# Patient Record
Sex: MALE | Race: BLACK OR AFRICAN AMERICAN | NOT HISPANIC OR LATINO | Employment: UNEMPLOYED | URBAN - METROPOLITAN AREA
[De-identification: names, ages, dates, MRNs, and addresses within clinical notes are randomized per-mention and may not be internally consistent; named-entity substitution may affect disease eponyms.]

---

## 2024-07-17 ENCOUNTER — PHARMACY VISIT (OUTPATIENT)
Dept: PHARMACY | Facility: MEDICAL CENTER | Age: 44
End: 2024-07-17
Payer: COMMERCIAL

## 2024-07-17 ENCOUNTER — HOSPITAL ENCOUNTER (EMERGENCY)
Facility: MEDICAL CENTER | Age: 44
End: 2024-07-17
Attending: EMERGENCY MEDICINE
Payer: COMMERCIAL

## 2024-07-17 VITALS
TEMPERATURE: 97.7 F | SYSTOLIC BLOOD PRESSURE: 145 MMHG | RESPIRATION RATE: 16 BRPM | BODY MASS INDEX: 38.3 KG/M2 | DIASTOLIC BLOOD PRESSURE: 88 MMHG | OXYGEN SATURATION: 100 % | WEIGHT: 244.05 LBS | HEART RATE: 53 BPM | HEIGHT: 67 IN

## 2024-07-17 DIAGNOSIS — R56.9 SEIZURE (HCC): ICD-10-CM

## 2024-07-17 PROCEDURE — 700102 HCHG RX REV CODE 250 W/ 637 OVERRIDE(OP): Mod: UD | Performed by: EMERGENCY MEDICINE

## 2024-07-17 PROCEDURE — RXMED WILLOW AMBULATORY MEDICATION CHARGE: Performed by: EMERGENCY MEDICINE

## 2024-07-17 PROCEDURE — A9270 NON-COVERED ITEM OR SERVICE: HCPCS | Mod: UD | Performed by: EMERGENCY MEDICINE

## 2024-07-17 PROCEDURE — 99283 EMERGENCY DEPT VISIT LOW MDM: CPT

## 2024-07-17 RX ORDER — LEVETIRACETAM 500 MG/1
500 TABLET ORAL ONCE
Status: COMPLETED | OUTPATIENT
Start: 2024-07-17 | End: 2024-07-17

## 2024-07-17 RX ORDER — LEVETIRACETAM 500 MG/1
500 TABLET ORAL 2 TIMES DAILY
Qty: 60 TABLET | Refills: 0 | Status: SHIPPED | OUTPATIENT
Start: 2024-07-17

## 2024-07-17 RX ADMIN — LEVETIRACETAM 500 MG: 500 TABLET, FILM COATED ORAL at 12:17

## 2024-07-17 ASSESSMENT — LIFESTYLE VARIABLES: DO YOU DRINK ALCOHOL: NO

## 2024-07-22 ENCOUNTER — HOSPITAL ENCOUNTER (EMERGENCY)
Facility: MEDICAL CENTER | Age: 44
End: 2024-07-22
Attending: EMERGENCY MEDICINE
Payer: COMMERCIAL

## 2024-07-22 VITALS
SYSTOLIC BLOOD PRESSURE: 135 MMHG | TEMPERATURE: 99.1 F | DIASTOLIC BLOOD PRESSURE: 97 MMHG | OXYGEN SATURATION: 96 % | HEART RATE: 65 BPM | RESPIRATION RATE: 16 BRPM

## 2024-07-22 DIAGNOSIS — R56.9 SEIZURE (HCC): ICD-10-CM

## 2024-07-22 LAB
ALBUMIN SERPL BCP-MCNC: 3.7 G/DL (ref 3.2–4.9)
ALBUMIN/GLOB SERPL: 1.4 G/DL
ALP SERPL-CCNC: 59 U/L (ref 30–99)
ALT SERPL-CCNC: 19 U/L (ref 2–50)
ANION GAP SERPL CALC-SCNC: 13 MMOL/L (ref 7–16)
APPEARANCE UR: CLEAR
AST SERPL-CCNC: 13 U/L (ref 12–45)
BACTERIA #/AREA URNS HPF: NEGATIVE /HPF
BASOPHILS # BLD AUTO: 0.5 % (ref 0–1.8)
BASOPHILS # BLD: 0.04 K/UL (ref 0–0.12)
BILIRUB SERPL-MCNC: 0.2 MG/DL (ref 0.1–1.5)
BILIRUB UR QL STRIP.AUTO: NEGATIVE
BUN SERPL-MCNC: 12 MG/DL (ref 8–22)
CALCIUM ALBUM COR SERPL-MCNC: 9.1 MG/DL (ref 8.5–10.5)
CALCIUM SERPL-MCNC: 8.9 MG/DL (ref 8.5–10.5)
CHLORIDE SERPL-SCNC: 107 MMOL/L (ref 96–112)
CO2 SERPL-SCNC: 21 MMOL/L (ref 20–33)
COLOR UR: YELLOW
CREAT SERPL-MCNC: 1.2 MG/DL (ref 0.5–1.4)
EOSINOPHIL # BLD AUTO: 0.07 K/UL (ref 0–0.51)
EOSINOPHIL NFR BLD: 0.8 % (ref 0–6.9)
EPI CELLS #/AREA URNS HPF: ABNORMAL /HPF
ERYTHROCYTE [DISTWIDTH] IN BLOOD BY AUTOMATED COUNT: 46.6 FL (ref 35.9–50)
GFR SERPLBLD CREATININE-BSD FMLA CKD-EPI: 77 ML/MIN/1.73 M 2
GLOBULIN SER CALC-MCNC: 2.6 G/DL (ref 1.9–3.5)
GLUCOSE SERPL-MCNC: 90 MG/DL (ref 65–99)
GLUCOSE UR STRIP.AUTO-MCNC: NEGATIVE MG/DL
HCT VFR BLD AUTO: 49 % (ref 42–52)
HGB BLD-MCNC: 16.5 G/DL (ref 14–18)
HYALINE CASTS #/AREA URNS LPF: ABNORMAL /LPF
IMM GRANULOCYTES # BLD AUTO: 0.02 K/UL (ref 0–0.11)
IMM GRANULOCYTES NFR BLD AUTO: 0.2 % (ref 0–0.9)
KETONES UR STRIP.AUTO-MCNC: NEGATIVE MG/DL
LEUKOCYTE ESTERASE UR QL STRIP.AUTO: ABNORMAL
LYMPHOCYTES # BLD AUTO: 2.71 K/UL (ref 1–4.8)
LYMPHOCYTES NFR BLD: 32 % (ref 22–41)
MCH RBC QN AUTO: 29.7 PG (ref 27–33)
MCHC RBC AUTO-ENTMCNC: 33.7 G/DL (ref 32.3–36.5)
MCV RBC AUTO: 88.1 FL (ref 81.4–97.8)
MICRO URNS: ABNORMAL
MONOCYTES # BLD AUTO: 0.57 K/UL (ref 0–0.85)
MONOCYTES NFR BLD AUTO: 6.7 % (ref 0–13.4)
NEUTROPHILS # BLD AUTO: 5.05 K/UL (ref 1.82–7.42)
NEUTROPHILS NFR BLD: 59.8 % (ref 44–72)
NITRITE UR QL STRIP.AUTO: NEGATIVE
NRBC # BLD AUTO: 0 K/UL
NRBC BLD-RTO: 0 /100 WBC (ref 0–0.2)
PH UR STRIP.AUTO: 5.5 [PH] (ref 5–8)
PLATELET # BLD AUTO: 313 K/UL (ref 164–446)
PMV BLD AUTO: 9.7 FL (ref 9–12.9)
POTASSIUM SERPL-SCNC: 4 MMOL/L (ref 3.6–5.5)
PROT SERPL-MCNC: 6.3 G/DL (ref 6–8.2)
PROT UR QL STRIP: NEGATIVE MG/DL
RBC # BLD AUTO: 5.56 M/UL (ref 4.7–6.1)
RBC # URNS HPF: ABNORMAL /HPF
RBC UR QL AUTO: NEGATIVE
SODIUM SERPL-SCNC: 141 MMOL/L (ref 135–145)
SP GR UR STRIP.AUTO: 1.02
TSH SERPL DL<=0.005 MIU/L-ACNC: 1.25 UIU/ML (ref 0.38–5.33)
UROBILINOGEN UR STRIP.AUTO-MCNC: 0.2 MG/DL
WBC # BLD AUTO: 8.5 K/UL (ref 4.8–10.8)
WBC #/AREA URNS HPF: ABNORMAL /HPF

## 2024-07-22 PROCEDURE — 85025 COMPLETE CBC W/AUTO DIFF WBC: CPT

## 2024-07-22 PROCEDURE — 99283 EMERGENCY DEPT VISIT LOW MDM: CPT | Mod: EDC

## 2024-07-22 PROCEDURE — 84443 ASSAY THYROID STIM HORMONE: CPT

## 2024-07-22 PROCEDURE — 80053 COMPREHEN METABOLIC PANEL: CPT

## 2024-07-22 PROCEDURE — 81001 URINALYSIS AUTO W/SCOPE: CPT

## 2024-07-22 PROCEDURE — 36415 COLL VENOUS BLD VENIPUNCTURE: CPT | Mod: EDC

## 2024-12-17 ENCOUNTER — TELEPHONE (OUTPATIENT)
Dept: HEALTH INFORMATION MANAGEMENT | Facility: OTHER | Age: 44
End: 2024-12-17

## 2025-04-17 ENCOUNTER — PHARMACY VISIT (OUTPATIENT)
Dept: PHARMACY | Facility: MEDICAL CENTER | Age: 45
End: 2025-04-17
Payer: COMMERCIAL

## 2025-04-17 ENCOUNTER — HOSPITAL ENCOUNTER (EMERGENCY)
Facility: MEDICAL CENTER | Age: 45
End: 2025-04-17
Attending: EMERGENCY MEDICINE
Payer: COMMERCIAL

## 2025-04-17 VITALS
TEMPERATURE: 98.5 F | DIASTOLIC BLOOD PRESSURE: 102 MMHG | RESPIRATION RATE: 18 BRPM | OXYGEN SATURATION: 98 % | BODY MASS INDEX: 38.75 KG/M2 | WEIGHT: 246.91 LBS | HEIGHT: 67 IN | HEART RATE: 74 BPM | SYSTOLIC BLOOD PRESSURE: 138 MMHG

## 2025-04-17 DIAGNOSIS — Z76.0 MEDICATION REFILL: ICD-10-CM

## 2025-04-17 DIAGNOSIS — R56.9 SEIZURE (HCC): ICD-10-CM

## 2025-04-17 PROCEDURE — 99282 EMERGENCY DEPT VISIT SF MDM: CPT

## 2025-04-17 PROCEDURE — RXMED WILLOW AMBULATORY MEDICATION CHARGE: Performed by: EMERGENCY MEDICINE

## 2025-04-17 RX ORDER — LEVETIRACETAM 500 MG/1
500 TABLET ORAL 2 TIMES DAILY
Qty: 60 TABLET | Refills: 0 | Status: SHIPPED | OUTPATIENT
Start: 2025-04-17

## 2025-04-17 ASSESSMENT — FIBROSIS 4 INDEX: FIB4 SCORE: 0.42

## 2025-04-17 NOTE — DISCHARGE INSTRUCTIONS
You were seen in the ER for medication refill.  Fill the medicine and take it as prescribed.  As we discussed in the ER I would like you to follow-up with a primary care provider and I placed a referral on your behalf.  You promised me that you would do so!  Return to the ER with any new or worsening symptoms.  I hope you have a good day.

## 2025-04-17 NOTE — ED PROVIDER NOTES
"ED Provider Note    CHIEF COMPLAINT  Chief Complaint   Patient presents with    Medication Refill     Patient reports need for refill of Levetiracetem 500 MG. Last dose this morning.        EXTERNAL RECORDS REVIEWED  Other none germane to today's visit    HPI/ROS  LIMITATION TO HISTORY   Select: : None  OUTSIDE HISTORIAN(S):  None    Sandro Daily Jr. is a 44 y.o. male who presents with a chief complaint of medication refill.  Patient takes Keppra 500 mg twice daily for seizures.  His last dose was this morning.  He has no current symptoms.  He does not have a primary care provider.    PAST MEDICAL HISTORY   has a past medical history of Seizure (HCC).    SURGICAL HISTORY  patient denies any surgical history    FAMILY HISTORY  No family history on file.    SOCIAL HISTORY  Social History     Tobacco Use    Smoking status: Some Days     Types: Cigarettes    Smokeless tobacco: Never   Vaping Use    Vaping status: Never Used   Substance and Sexual Activity    Alcohol use: Not Currently    Drug use: Yes     Types: Inhaled     Comment: CBD THC    Sexual activity: Not on file       CURRENT MEDICATIONS  Home Medications       Reviewed by Estelita Madsen R.N. (Registered Nurse) on 04/17/25 at 1238  Med List Status: Not Addressed     Medication Last Dose Status   levETIRAcetam (KEPPRA) 500 MG Tab  Active                    ALLERGIES  No Known Allergies    PHYSICAL EXAM  VITAL SIGNS: BP (!) 143/106   Pulse 63   Temp 36.4 °C (97.5 °F) (Temporal)   Resp 16   Ht 1.702 m (5' 7\")   Wt 112 kg (246 lb 14.6 oz)   SpO2 97%   BMI 38.67 kg/m²    Physical Exam  Vitals and nursing note reviewed.   Constitutional:       Appearance: Normal appearance.   HENT:      Head: Normocephalic and atraumatic.      Right Ear: External ear normal.      Left Ear: External ear normal.      Nose: Nose normal.      Mouth/Throat:      Mouth: Mucous membranes are moist.      Pharynx: Oropharynx is clear.   Eyes:      Extraocular Movements: " Extraocular movements intact.      Conjunctiva/sclera: Conjunctivae normal.      Pupils: Pupils are equal, round, and reactive to light.   Cardiovascular:      Rate and Rhythm: Normal rate.   Pulmonary:      Effort: Pulmonary effort is normal.   Musculoskeletal:         General: Normal range of motion.      Cervical back: Normal range of motion and neck supple.   Skin:     General: Skin is warm and dry.   Neurological:      General: No focal deficit present.      Mental Status: He is alert and oriented to person, place, and time.   Psychiatric:         Mood and Affect: Mood normal.         Behavior: Behavior normal.       EKG/LABS  N/A    RADIOLOGY/PROCEDURES   My preliminary interpretation is as follows: N/A    Radiologist interpretation:  No orders to display       COURSE & MEDICAL DECISION MAKING    ASSESSMENT, COURSE AND PLAN  Care Narrative: This is a 44 year old male with a history of seizure who is here requesting a refill of his keppra. No current symptoms. No recent seizures. He does not have a PCP so I will place a referral. He is well appearing. Has a non-focal neurologic exam. I refilled his Keppra and encouraged him to establish with PMD. Discharged in good and stable condition with strict return precautions.    ADDITIONAL PROBLEMS MANAGED  None    DISPOSITION AND DISCUSSIONS  I have discussed management of the patient with the following physicians and PRITESH's:  None    Discussion of management with other [facility] or appropriate source(s): None     Escalation of care considered, and ultimately not performed: N/A    Barriers to care at this time, including but not limited to: Patient does not have established PCP.     Decision tools and prescription drugs considered including, but not limited to:  Keppra .    FINAL DIAGNOSIS  1. Medication refill    2. Seizure (HCC)         Electronically signed by: Jose Alfredo Tay M.D., 4/17/2025 1:14 PM

## 2025-04-17 NOTE — ED TRIAGE NOTES
Chief Complaint   Patient presents with    Medication Refill     Patient reports need for refill of Levetiracetem 500 MG. Last dose this morning.

## 2025-04-17 NOTE — ED NOTES
Pt ambulated to T-1 with a steady gait. C/C is Med Refill. Pt is within line of sight of nursing care team. Chart up for ERP.

## 2025-04-17 NOTE — ED NOTES
Pt was educated on discharge of where to  medication and how to access their chart from [facility]. Pt left with a GCS of 15 and ambulated to the lobby with no assistance and all belongings in hand.

## 2025-05-29 ENCOUNTER — PHARMACY VISIT (OUTPATIENT)
Dept: PHARMACY | Facility: MEDICAL CENTER | Age: 45
End: 2025-05-29
Payer: COMMERCIAL

## 2025-05-29 ENCOUNTER — HOSPITAL ENCOUNTER (EMERGENCY)
Facility: MEDICAL CENTER | Age: 45
End: 2025-05-29
Attending: EMERGENCY MEDICINE
Payer: COMMERCIAL

## 2025-05-29 VITALS
SYSTOLIC BLOOD PRESSURE: 130 MMHG | TEMPERATURE: 97.6 F | HEIGHT: 67 IN | HEART RATE: 64 BPM | RESPIRATION RATE: 18 BRPM | BODY MASS INDEX: 32.8 KG/M2 | DIASTOLIC BLOOD PRESSURE: 79 MMHG | WEIGHT: 209 LBS | OXYGEN SATURATION: 97 %

## 2025-05-29 DIAGNOSIS — L03.119 CELLULITIS OF UPPER EXTREMITY, UNSPECIFIED LATERALITY: ICD-10-CM

## 2025-05-29 DIAGNOSIS — L30.9 DERMATITIS: ICD-10-CM

## 2025-05-29 DIAGNOSIS — R21 RASH: Primary | ICD-10-CM

## 2025-05-29 DIAGNOSIS — I10 HYPERTENSION, UNSPECIFIED TYPE: ICD-10-CM

## 2025-05-29 PROCEDURE — A9270 NON-COVERED ITEM OR SERVICE: HCPCS | Mod: UD | Performed by: EMERGENCY MEDICINE

## 2025-05-29 PROCEDURE — 99283 EMERGENCY DEPT VISIT LOW MDM: CPT

## 2025-05-29 PROCEDURE — 700102 HCHG RX REV CODE 250 W/ 637 OVERRIDE(OP): Mod: UD | Performed by: EMERGENCY MEDICINE

## 2025-05-29 PROCEDURE — RXMED WILLOW AMBULATORY MEDICATION CHARGE: Performed by: EMERGENCY MEDICINE

## 2025-05-29 PROCEDURE — 700111 HCHG RX REV CODE 636 W/ 250 OVERRIDE (IP): Mod: JZ,UD | Performed by: EMERGENCY MEDICINE

## 2025-05-29 PROCEDURE — 96372 THER/PROPH/DIAG INJ SC/IM: CPT

## 2025-05-29 RX ORDER — AMLODIPINE BESYLATE 5 MG/1
5 TABLET ORAL DAILY
Qty: 30 TABLET | Refills: 0 | Status: SHIPPED | OUTPATIENT
Start: 2025-05-29

## 2025-05-29 RX ORDER — METHYLPREDNISOLONE SODIUM SUCCINATE 125 MG/2ML
125 INJECTION, POWDER, LYOPHILIZED, FOR SOLUTION INTRAMUSCULAR; INTRAVENOUS ONCE
Status: COMPLETED | OUTPATIENT
Start: 2025-05-29 | End: 2025-05-29

## 2025-05-29 RX ORDER — CEPHALEXIN 500 MG/1
500 CAPSULE ORAL 4 TIMES DAILY
Qty: 20 CAPSULE | Refills: 0 | Status: ACTIVE | OUTPATIENT
Start: 2025-05-29 | End: 2025-06-03

## 2025-05-29 RX ORDER — DIPHENHYDRAMINE HCL 25 MG
25 TABLET ORAL ONCE
Status: COMPLETED | OUTPATIENT
Start: 2025-05-29 | End: 2025-05-29

## 2025-05-29 RX ORDER — DIPHENHYDRAMINE HCL 25 MG
25 CAPSULE ORAL EVERY 6 HOURS PRN
Qty: 30 CAPSULE | Refills: 0 | Status: SHIPPED | OUTPATIENT
Start: 2025-05-29

## 2025-05-29 RX ORDER — PREDNISONE 20 MG/1
40 TABLET ORAL DAILY
Qty: 10 TABLET | Refills: 0 | Status: SHIPPED | OUTPATIENT
Start: 2025-05-29 | End: 2025-06-03

## 2025-05-29 RX ADMIN — METHYLPREDNISOLONE SODIUM SUCCINATE 125 MG: 125 INJECTION INTRAMUSCULAR; INTRAVENOUS at 11:29

## 2025-05-29 RX ADMIN — DIPHENHYDRAMINE HYDROCHLORIDE 25 MG: 25 TABLET ORAL at 11:29

## 2025-05-29 ASSESSMENT — FIBROSIS 4 INDEX: FIB4 SCORE: 0.42

## 2025-05-29 NOTE — ED PROVIDER NOTES
ED Provider Note    CHIEF COMPLAINT  Chief Complaint   Patient presents with    Rash     Ambulates to triage reports rash all over body x 2 weeks. Denies any chemical exposure, denies taking new medication. Tried OTC cortisone cream w/o relief.        EXTERNAL RECORDS REVIEWED  Other noncontributory    HPI/ROS  LIMITATION TO HISTORY   Select: : None  OUTSIDE HISTORIAN(S):  none    Sandro Daily Jr. is a 44 y.o. male who presents to the emergency department through triage for rash.  Patient describes diffuse rash for couple of weeks, it is progressed since onset.  He used an Aveeno shower gel and did not wipe/rinse it off and believe this may have triggered it.  However he does use this before.  He denies any change in other hygiene products.  Denies history of similar rash.  Some rash on the face, no oral swelling, no difficulty swallowing or breathing.  No fever.  Denies travel.  Denies sick contacts.    Patient states he has used hydrocortisone 10 cream without improvement.    Patient is also concerned for high blood pressure, states he takes his aunties or other friend's blood pressure medication because he believes he should be on some.  Previously advised such medication but cannot remember what this was.  He plans to follow-up with primary care shortly.    PAST MEDICAL HISTORY   has a past medical history of Seizure (HCC).    SURGICAL HISTORY  patient denies any surgical history    FAMILY HISTORY  History reviewed. No pertinent family history.    SOCIAL HISTORY  Social History     Tobacco Use    Smoking status: Some Days     Types: Cigarettes    Smokeless tobacco: Never   Vaping Use    Vaping status: Never Used   Substance and Sexual Activity    Alcohol use: Not Currently    Drug use: Yes     Types: Inhaled     Comment: CBD THC    Sexual activity: Not on file       CURRENT MEDICATIONS  Home Medications       Reviewed by Tamara Isaac R.N. (Registered Nurse) on 05/29/25 at 0956  Med List Status: Partial  "    Medication Last Dose Status   levETIRAcetam (KEPPRA) 500 MG Tab  Active                    ALLERGIES  Allergies[1]    PHYSICAL EXAM  VITAL SIGNS: /79   Pulse 64   Temp 36.4 °C (97.6 °F) (Temporal)   Resp 18   Ht 1.702 m (5' 7\")   Wt 94.8 kg (208 lb 15.9 oz)   SpO2 97%   BMI 32.73 kg/m²    Pulse ox interpretation: I interpret this pulse ox as normal.  Constitutional: Alert in no apparent distress.  HENT: Normocephalic, atraumatic. Bilateral external ears normal, Nose normal. Moist mucous membranes.  No oral lesions, mucous membrane or buccal involvement  Eyes: Pupils are equal and reactive, Conjunctiva normal.   Neck: Normal range of motion, Supple  Lymphatic: No lymphadenopathy noted.   Cardiovascular: No peripheral perfusion  Thorax & Lungs:   Skin: Warm, Dry.  Scattered almost circular, macular papular dry rash with some central clearing.  Bilateral axilla with periphery of erythema and warmth, no weeping.  No induration or fluctuance.  No crepitus.  Bilateral shins with larger similar ovoid lesions, some central clearing and dry scaly skin.  Musculoskeletal: Good range of motion in all major joints.   Neurologic: Alert and orient x 4.  Speech clear and cohesive  Psychiatric: Affect normal, Judgment normal, Mood normal.       COURSE & MEDICAL DECISION MAKING    ASSESSMENT, COURSE AND PLAN  Care Narrative:   ED evaluation for rash is nonspecific dermatitis but clinically more suggestive of her psoriatic rash.  Patient denies any history of the same or other autoimmune disorder.  Denies any fevers.  He will be treated empirically with Benadryl for the pruritus, Solu-Medrol in the emergency department with 5-day course of prednisone.  Keflex for developing cellulitis of the bilateral axilla.  Outpatient follow-up encouraged with primary care for such or meet specialty referral if indicated as well as for blood pressure monitoring and management.  Will start low-dose amlodipine.  Patient is otherwise " asymptomatic, no headache, visual changes, chest pain, shortness of breath or edema.    ADDITIONAL PROBLEMS MANAGED      DISPOSITION AND DISCUSSIONS    Discussion of management with other [facility] or appropriate source(s): None     Escalation of care considered, and ultimately not performed: None    Barriers to care at this time, including but not limited to: Patient does not have established PCP.     Decision tools and prescription drugs considered including, but not limited to: None.    The patient is stable for discharge home, anticipatory guidance provided, prednisone for 5 days, Benadryl as needed, Keflex for 5 days, amlodipine daily, close follow-up is encouraged and strict return instructions have been discussed. Patient is agreeable to the disposition and plan.      FINAL DIAGNOSIS  1. Rash    2. Dermatitis    3. Cellulitis of upper extremity, unspecified laterality    4. Hypertension, unspecified type         Electronically signed by: Alea Hernandez D.O., 5/29/2025 10:54 AM           [1] No Known Allergies

## 2025-05-29 NOTE — DISCHARGE INSTRUCTIONS
Follow-up with primary care next week for reevaluation of rash (wound check; although this may be psoriatic/inflammatory in nature and further workup is recommended), medication management and close blood pressure monitoring.    Benadryl every 6 hours as needed for rash or itching.  Prednisone daily for 5 days for rash, allergic reaction.  Keflex 4 times daily for 5 days for infection developing at the site of rash in bilateral armpits.    Start taking Norvasc daily for high blood pressure.  This medication will need to be monitored, titrated by primary care.  If possible, check your blood pressure 1 time daily and keep a log for primary care review.    Gentle unscented soapy water cleanses with showers.  Apply Aveeno or Eucerin unscented cream to affected dry areas.    Return to the emergency department for persistent worsening rash, oral or facial swelling, weeping, fever, vomiting, uncontrolled blood pressure, headache, altered mental status or other new concerns.

## 2025-05-29 NOTE — ED NOTES
Reviewed discharge instructions with patient at bedside, answered any remaining patient questions. [facility]

## 2025-05-29 NOTE — ED TRIAGE NOTES
Chief Complaint   Patient presents with    Rash     Ambulates to triage reports rash all over body x 2 weeks. Denies any chemical exposure, denies taking new medication. Tried OTC cortisone cream w/o relief.      Denies difficulty breathing. NAD. Educated on triage process. Instructed to notify staff for any worsening symptoms. Denies any recent travel. Denies exposure to known covid positive patients. Denies any respiratory symptoms.